# Patient Record
Sex: MALE | URBAN - METROPOLITAN AREA
[De-identification: names, ages, dates, MRNs, and addresses within clinical notes are randomized per-mention and may not be internally consistent; named-entity substitution may affect disease eponyms.]

---

## 2018-02-20 ENCOUNTER — IMPORTED ENCOUNTER (OUTPATIENT)
Dept: URBAN - METROPOLITAN AREA CLINIC 31 | Facility: CLINIC | Age: 48
End: 2018-02-20

## 2018-02-20 PROCEDURE — 99203 OFFICE O/P NEW LOW 30 MIN: CPT

## 2018-02-20 NOTE — PATIENT DISCUSSION
Risk of infection reviewed. Would recommend changing to Vigamox for more broadspectrum coverage. Pt has normal RF and negative FERNY. ESR was ordered but not available. 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 Would recommend additional blood work CRP HLA- [id number] and ANCA. There is possibility that perforation will close under glue. Would recommend additional antiinflammatory medication to help with healing. Start Prednisone 20 mg qd and PRED 1% 4x/d. If fails to close and glue comes off will need lamellar patch graft t/c DALK. 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 Can certainly try as an option but would recommend DALK. Followup Dr Ree Villatoro in [facility]. Risks benefits of surgery reviewed including infection bleeding loss of vision retinal problems. Check results of autoimmune workup.   If positive may need systemic immunosuppression and referral to Rheumatologist.

## 2018-02-21 PROBLEM — H16.042: Noted: 2018-02-21

## 2018-02-21 PROBLEM — H16.071: Noted: 2018-02-21

## 2022-04-02 ASSESSMENT — VISUAL ACUITY
OD_CC: 20/30
OS_CC: 20/20